# Patient Record
Sex: FEMALE | Race: OTHER | NOT HISPANIC OR LATINO
[De-identification: names, ages, dates, MRNs, and addresses within clinical notes are randomized per-mention and may not be internally consistent; named-entity substitution may affect disease eponyms.]

---

## 2017-04-21 ENCOUNTER — APPOINTMENT (OUTPATIENT)
Dept: SPEECH THERAPY | Facility: CLINIC | Age: 57
End: 2017-04-21

## 2017-04-21 ENCOUNTER — OUTPATIENT (OUTPATIENT)
Dept: OUTPATIENT SERVICES | Facility: HOSPITAL | Age: 57
LOS: 1 days | Discharge: HOME | End: 2017-04-21

## 2017-04-25 DIAGNOSIS — H90.42 SENSORINEURAL HEARING LOSS, UNILATERAL, LEFT EAR, WITH UNRESTRICTED HEARING ON THE CONTRALATERAL SIDE: ICD-10-CM

## 2017-07-11 ENCOUNTER — APPOINTMENT (OUTPATIENT)
Dept: OTOLARYNGOLOGY | Facility: CLINIC | Age: 57
End: 2017-07-11

## 2021-05-31 ENCOUNTER — EMERGENCY (EMERGENCY)
Facility: HOSPITAL | Age: 61
LOS: 1 days | Discharge: HOME | End: 2021-05-31
Payer: COMMERCIAL

## 2021-05-31 VITALS
SYSTOLIC BLOOD PRESSURE: 143 MMHG | HEIGHT: 66 IN | HEART RATE: 76 BPM | OXYGEN SATURATION: 100 % | DIASTOLIC BLOOD PRESSURE: 82 MMHG | WEIGHT: 134.04 LBS | RESPIRATION RATE: 18 BRPM | TEMPERATURE: 98 F

## 2021-05-31 VITALS
RESPIRATION RATE: 20 BRPM | DIASTOLIC BLOOD PRESSURE: 80 MMHG | OXYGEN SATURATION: 98 % | TEMPERATURE: 98 F | HEART RATE: 69 BPM | SYSTOLIC BLOOD PRESSURE: 118 MMHG

## 2021-05-31 DIAGNOSIS — D21.9 BENIGN NEOPLASM OF CONNECTIVE AND OTHER SOFT TISSUE, UNSPECIFIED: Chronic | ICD-10-CM

## 2021-05-31 LAB
ALBUMIN SERPL ELPH-MCNC: 4.2 G/DL — SIGNIFICANT CHANGE UP (ref 3.3–5)
ALP SERPL-CCNC: 75 U/L — SIGNIFICANT CHANGE UP (ref 40–120)
ALT FLD-CCNC: 43 U/L — SIGNIFICANT CHANGE UP (ref 10–45)
ANION GAP SERPL CALC-SCNC: 12 MMOL/L — SIGNIFICANT CHANGE UP (ref 5–17)
APPEARANCE UR: CLEAR — SIGNIFICANT CHANGE UP
AST SERPL-CCNC: 32 U/L — SIGNIFICANT CHANGE UP (ref 10–40)
BACTERIA # UR AUTO: NEGATIVE — SIGNIFICANT CHANGE UP
BASOPHILS # BLD AUTO: 0.06 K/UL — SIGNIFICANT CHANGE UP (ref 0–0.2)
BASOPHILS NFR BLD AUTO: 1 % — SIGNIFICANT CHANGE UP (ref 0–2)
BILIRUB SERPL-MCNC: 0.4 MG/DL — SIGNIFICANT CHANGE UP (ref 0.2–1.2)
BILIRUB UR-MCNC: NEGATIVE — SIGNIFICANT CHANGE UP
BUN SERPL-MCNC: 10 MG/DL — SIGNIFICANT CHANGE UP (ref 7–23)
CALCIUM SERPL-MCNC: 9.2 MG/DL — SIGNIFICANT CHANGE UP (ref 8.4–10.5)
CHLORIDE SERPL-SCNC: 106 MMOL/L — SIGNIFICANT CHANGE UP (ref 96–108)
CO2 SERPL-SCNC: 23 MMOL/L — SIGNIFICANT CHANGE UP (ref 22–31)
COLOR SPEC: SIGNIFICANT CHANGE UP
CREAT SERPL-MCNC: 0.54 MG/DL — SIGNIFICANT CHANGE UP (ref 0.5–1.3)
DIFF PNL FLD: NEGATIVE — SIGNIFICANT CHANGE UP
EOSINOPHIL # BLD AUTO: 0.18 K/UL — SIGNIFICANT CHANGE UP (ref 0–0.5)
EOSINOPHIL NFR BLD AUTO: 3 % — SIGNIFICANT CHANGE UP (ref 0–6)
EPI CELLS # UR: 0 /HPF — SIGNIFICANT CHANGE UP
GLUCOSE SERPL-MCNC: 113 MG/DL — HIGH (ref 70–99)
GLUCOSE UR QL: NEGATIVE — SIGNIFICANT CHANGE UP
HCT VFR BLD CALC: 41.9 % — SIGNIFICANT CHANGE UP (ref 34.5–45)
HGB BLD-MCNC: 13.8 G/DL — SIGNIFICANT CHANGE UP (ref 11.5–15.5)
HYALINE CASTS # UR AUTO: 0 /LPF — SIGNIFICANT CHANGE UP (ref 0–2)
IMM GRANULOCYTES NFR BLD AUTO: 0.5 % — SIGNIFICANT CHANGE UP (ref 0–1.5)
KETONES UR-MCNC: NEGATIVE — SIGNIFICANT CHANGE UP
LEUKOCYTE ESTERASE UR-ACNC: NEGATIVE — SIGNIFICANT CHANGE UP
LYMPHOCYTES # BLD AUTO: 1.69 K/UL — SIGNIFICANT CHANGE UP (ref 1–3.3)
LYMPHOCYTES # BLD AUTO: 28.3 % — SIGNIFICANT CHANGE UP (ref 13–44)
MCHC RBC-ENTMCNC: 31.5 PG — SIGNIFICANT CHANGE UP (ref 27–34)
MCHC RBC-ENTMCNC: 32.9 GM/DL — SIGNIFICANT CHANGE UP (ref 32–36)
MCV RBC AUTO: 95.7 FL — SIGNIFICANT CHANGE UP (ref 80–100)
MONOCYTES # BLD AUTO: 0.49 K/UL — SIGNIFICANT CHANGE UP (ref 0–0.9)
MONOCYTES NFR BLD AUTO: 8.2 % — SIGNIFICANT CHANGE UP (ref 2–14)
NEUTROPHILS # BLD AUTO: 3.52 K/UL — SIGNIFICANT CHANGE UP (ref 1.8–7.4)
NEUTROPHILS NFR BLD AUTO: 59 % — SIGNIFICANT CHANGE UP (ref 43–77)
NITRITE UR-MCNC: NEGATIVE — SIGNIFICANT CHANGE UP
NRBC # BLD: 0 /100 WBCS — SIGNIFICANT CHANGE UP (ref 0–0)
PH UR: 8 — SIGNIFICANT CHANGE UP (ref 5–8)
PLATELET # BLD AUTO: 175 K/UL — SIGNIFICANT CHANGE UP (ref 150–400)
POTASSIUM SERPL-MCNC: 4.2 MMOL/L — SIGNIFICANT CHANGE UP (ref 3.5–5.3)
POTASSIUM SERPL-SCNC: 4.2 MMOL/L — SIGNIFICANT CHANGE UP (ref 3.5–5.3)
PROT SERPL-MCNC: 7.5 G/DL — SIGNIFICANT CHANGE UP (ref 6–8.3)
PROT UR-MCNC: NEGATIVE — SIGNIFICANT CHANGE UP
RBC # BLD: 4.38 M/UL — SIGNIFICANT CHANGE UP (ref 3.8–5.2)
RBC # FLD: 12.6 % — SIGNIFICANT CHANGE UP (ref 10.3–14.5)
RBC CASTS # UR COMP ASSIST: 3 /HPF — SIGNIFICANT CHANGE UP (ref 0–4)
SODIUM SERPL-SCNC: 141 MMOL/L — SIGNIFICANT CHANGE UP (ref 135–145)
SP GR SPEC: 1.01 — SIGNIFICANT CHANGE UP (ref 1.01–1.02)
UROBILINOGEN FLD QL: NEGATIVE — SIGNIFICANT CHANGE UP
WBC # BLD: 5.97 K/UL — SIGNIFICANT CHANGE UP (ref 3.8–10.5)
WBC # FLD AUTO: 5.97 K/UL — SIGNIFICANT CHANGE UP (ref 3.8–10.5)
WBC UR QL: 1 /HPF — SIGNIFICANT CHANGE UP (ref 0–5)

## 2021-05-31 PROCEDURE — 93010 ELECTROCARDIOGRAM REPORT: CPT

## 2021-05-31 PROCEDURE — 96374 THER/PROPH/DIAG INJ IV PUSH: CPT

## 2021-05-31 PROCEDURE — 85025 COMPLETE CBC W/AUTO DIFF WBC: CPT

## 2021-05-31 PROCEDURE — 70450 CT HEAD/BRAIN W/O DYE: CPT | Mod: 26,MA

## 2021-05-31 PROCEDURE — 99285 EMERGENCY DEPT VISIT HI MDM: CPT

## 2021-05-31 PROCEDURE — 82962 GLUCOSE BLOOD TEST: CPT

## 2021-05-31 PROCEDURE — 80053 COMPREHEN METABOLIC PANEL: CPT

## 2021-05-31 PROCEDURE — 81001 URINALYSIS AUTO W/SCOPE: CPT

## 2021-05-31 PROCEDURE — 93005 ELECTROCARDIOGRAM TRACING: CPT

## 2021-05-31 PROCEDURE — 96375 TX/PRO/DX INJ NEW DRUG ADDON: CPT

## 2021-05-31 PROCEDURE — 87086 URINE CULTURE/COLONY COUNT: CPT

## 2021-05-31 PROCEDURE — 70450 CT HEAD/BRAIN W/O DYE: CPT

## 2021-05-31 PROCEDURE — 99284 EMERGENCY DEPT VISIT MOD MDM: CPT | Mod: 25

## 2021-05-31 RX ORDER — MECLIZINE HCL 12.5 MG
1 TABLET ORAL
Qty: 15 | Refills: 0
Start: 2021-05-31 | End: 2021-08-06

## 2021-05-31 RX ORDER — ONDANSETRON 8 MG/1
1 TABLET, FILM COATED ORAL
Qty: 16 | Refills: 0
Start: 2021-05-31 | End: 2021-06-03

## 2021-05-31 RX ORDER — MECLIZINE HCL 12.5 MG
25 TABLET ORAL ONCE
Refills: 0 | Status: COMPLETED | OUTPATIENT
Start: 2021-05-31 | End: 2021-05-31

## 2021-05-31 RX ORDER — ONDANSETRON 8 MG/1
1 TABLET, FILM COATED ORAL
Qty: 16 | Refills: 0
Start: 2021-05-31 | End: 2021-08-05

## 2021-05-31 RX ORDER — MECLIZINE HCL 12.5 MG
1 TABLET ORAL
Qty: 15 | Refills: 0
Start: 2021-05-31 | End: 2021-06-04

## 2021-05-31 RX ORDER — ONDANSETRON 8 MG/1
4 TABLET, FILM COATED ORAL ONCE
Refills: 0 | Status: COMPLETED | OUTPATIENT
Start: 2021-05-31 | End: 2021-05-31

## 2021-05-31 RX ORDER — SODIUM CHLORIDE 9 MG/ML
1000 INJECTION, SOLUTION INTRAVENOUS ONCE
Refills: 0 | Status: COMPLETED | OUTPATIENT
Start: 2021-05-31 | End: 2021-05-31

## 2021-05-31 RX ADMIN — SODIUM CHLORIDE 2000 MILLILITER(S): 9 INJECTION, SOLUTION INTRAVENOUS at 12:35

## 2021-05-31 RX ADMIN — ONDANSETRON 4 MILLIGRAM(S): 8 TABLET, FILM COATED ORAL at 12:04

## 2021-05-31 RX ADMIN — Medication 25 MILLIGRAM(S): at 12:04

## 2021-05-31 NOTE — ED PROVIDER NOTE - PATIENT PORTAL LINK FT
You can access the FollowMyHealth Patient Portal offered by [facility] by registering at the following website: [email or web address]. By joining [employer]’s FollowMyHealth portal, you will also be able to view your health information using other applications (apps) compatible with our system.

## 2021-05-31 NOTE — ED PROVIDER NOTE - OBJECTIVE STATEMENT
60F hx of hypothyroidism presents to the ED for episodic dizziness ongoing x5 days, worse with movement, better with lying still, that patient felt was similar to prior episodes of vertigo. Tried meclizine and tylenol with some improvement. Today at 5am, patient woke up, felt dizzy (room spinning), went back to sleep. Then woke up again, tried to walk to the bathroom, felt dizziness, and subsequently had a syncopal event x1 (witnessed by , fell to floor onto right knee) woke up within seconds. Since arrival to the ED, patient felt much improved and is ambulatory. Denies ringing in ear, fever, chills, chest pain, shortness of breath, vomiting, facial droop, weakness, bleeding anywhere. Follows with an ENT outpatient.

## 2021-05-31 NOTE — ED PROVIDER NOTE - PHYSICAL EXAMINATION
GENERAL: middle aged female, lying in bed, NAD. Vital signs are within normal limits  EYES: Conjunctiva noninjected or pale, sclera anicteric  HENT: NC/AT, moist mucous membranes, tmi b/l  NECK: Supple, trachea midline, no carotid bruits  LUNG: Nonlabored respirations, no wheezes, rales  CV: RRR, no murmurs, Pulses- Radial: 2+ b/l  ABDOMEN: Nondistended, nontender  MSK: No visible deformities, nontender extremities, no LE edema  SKIN: No rashes, bruises  NEURO: AAOx4 (to person, place, time, event), no tremor, steady gait  -Cranial Nerves:  --CN II: PERRL  --CN III, IV, VI: EOMI bilateral, no nystagmus  --CN V1-3: Facial sensation intact to touch  --CN VII: No facial asymmetry or droop  --CN VIII: Hearing intact to rubbing fingers b/l  --CN IX, X: Palate elevates symmetrically. Normal phonation  --CN XI: Heading turning and shoulder shrug intact b/l  --CN XII: Tongue midline with normal movements   PSYCH: Normal mood and affect GENERAL: middle aged female, lying in bed, NAD. Vital signs are within normal limits  EYES: Conjunctiva noninjected or pale, sclera anicteric  HENT: NC/AT, moist mucous membranes, tmi b/l  NECK: Supple, trachea midline, no carotid bruits  LUNG: Nonlabored respirations, no wheezes, rales  CV: RRR, no murmurs, Pulses- Radial: 2+ b/l  ABDOMEN: Nondistended, nontender  MSK: No visible deformities, nontender extremities, no LE edema  SKIN: No rashes, bruises  NEURO: AAOx4 (to person, place, time, event), no tremor, steady gait but appears stiff when walking  -Cranial Nerves:  --CN II: PERRL  --CN III, IV, VI: EOMI bilateral, no nystagmus  --CN V1-3: Facial sensation intact to touch  --CN VII: No facial asymmetry or droop  --CN VIII: Hearing intact to rubbing fingers b/l  --CN IX, X: Palate elevates symmetrically. Normal phonation  --CN XI: Heading turning and shoulder shrug intact b/l  --CN XII: Tongue midline with normal movements   PSYCH: Normal mood and affect

## 2021-05-31 NOTE — ED ADULT TRIAGE NOTE - CHIEF COMPLAINT QUOTE
Pt has for 4 to 5 days felt dizzy pt feels like the room is spinning pt states she lost conscientious 5 tis morning and went back to bed and took a nap

## 2021-05-31 NOTE — ED PROVIDER NOTE - NSFOLLOWUPINSTRUCTIONS_ED_ALL_ED_FT
YOU WERE SEEN IN THE ED FOR: dizziness    YOU WERE PRESCRIBED: zofran and meclizine  FOLLOW THE INSTRUCTIONS ON THE LABEL/CONTAINER    STAY HYDRATED,    PLEASE FOLLOW UP WITH YOUR PRIVATE PHYSICIAN WITHIN THE NEXT 48 HOURS. BRING COPIES OF YOUR RESULTS.    RETURN TO THE EMERGENCY DEPARTMENT IF YOU EXPERIENCE ANY NEW/CONCERNING/WORSENING SYMPTOMS.

## 2021-05-31 NOTE — ED ADULT NURSE NOTE - OBJECTIVE STATEMENT
60 year old female with Hx of vertigo presents to the ED complaining of 5 days of dizziness and one episode of syncope this AM. Pt states this morning she syncopized around 0500 and then ent back to bed and took a nap. Pt is A&O x 4, VSS, afebrile, ambulating independently. Pt denies fever, chills, VD, SOB, or chest pain. IV placed, labs drawn, bed in low position, safety measures in place. Pt placed on CM showing NS, BG WNL, EKG completed in triage and given to attending. call bell within reach. 60 year old female with Hx of vertigo presents to the ED complaining of 5 days of dizziness and one episode of syncope this AM. Pt states this morning she syncopized around 0500 and then ent back to bed and took a nap. Pt is A&O x 4, VSS, afebrile, ambulating independently. Pt denies fever, chills, VD, SOB, or chest pain. IV placed, labs drawn, bed in low position, safety measures in place. Pt placed on CM showing NS, BG WNL, EKG completed in triage and given to attending. call bell within reach. on neurological assessment no neural or focal deficits noted.

## 2021-05-31 NOTE — ED PROVIDER NOTE - NS ED ROS FT
CONSTITUTIONAL: DIZZINESS. No fevers, chills, fatigue, weakness, unexpected weight change  EYES: No double vision, blurry vision  ENT: No ear pain, nasal congestion, runny nose, sore throat, tooth pain  CV: No chest pain, palpitations  PULM: No cough, shortness of breath  GI: + nausea. No abdominal pain, vomiting, diarrhea  : No dysuria, polyuria, hematuria  SKIN: No rashes, facial swelling  MSK: No neck pain  NEURO: + syncope. No headache, numbness, tingling, seizures  PSYCHIATRIC: No auditory, visual, tactile hallucinations CONSTITUTIONAL: DIZZINESS. No fevers, chills, fatigue, weakness, unexpected weight change  EYES: No double vision, blurry vision  ENT: No ear pain, nasal congestion, runny nose, sore throat, tooth pain  CV: No chest pain, palpitations  PULM: No cough, shortness of breath  GI: + nausea. No abdominal pain, vomiting, diarrhea  : No dysuria, polyuria, hematuria  SKIN: No rashes, facial swelling  MSK: No neck pain  NEURO: + syncope, mild headache. No numbness, tingling, seizures  PSYCHIATRIC: No auditory, visual, tactile hallucinations

## 2021-05-31 NOTE — ED PROVIDER NOTE - PROGRESS NOTE DETAILS
Ramo Patel D.O., PGY2 (Resident)  CTH with what appears to look like right interparenchymal hemorrhage. Patient with slight headache. No focal neuro deficits. Pending official rad read. Ramo Patel D.O., PGY2 (Resident)  Formerly [facility], [facility]. Patient significantly improved. Ambulatory. No focal neuro deficits.  Results endorsed. Return precautions given. Patient expressed verbal understanding.  Will DC with outpatient follow-up.

## 2021-05-31 NOTE — ED ADULT NURSE NOTE - NSIMPLEMENTINTERV_GEN_ALL_ED
Implemented All Fall Risk Interventions:  [place] to call system. Call bell, personal items and telephone within reach. Instruct patient to call for assistance. Room bathroom lighting operational. Non-slip footwear when patient is off stretcher. Physically safe environment: no spills, clutter or unnecessary equipment. Stretcher in lowest position, wheels locked, appropriate side rails in place. Provide visual cue, wrist band, yellow gown, etc. Monitor gait and stability. Monitor for mental status changes and reorient to person, place, and time. Review medications for side effects contributing to fall risk. Reinforce activity limits and safety measures with patient and family.

## 2021-05-31 NOTE — ED PROVIDER NOTE - CLINICAL SUMMARY MEDICAL DECISION MAKING FREE TEXT BOX
60F hx of hypothyroidism here for 5 days of dizziness, worse today with witnessed syncopal episode. Ambulatory w/o focal neuro deficits. Vitals wnl. Suspect more peripheral neuro etiology. Lower suspicion for primary neurovasc. Possible intracranial mass. Unlikely cardiac etiology. check labs, cardiac monitor, ekg, FS (for hypoglcyemia).

## 2021-06-01 LAB
CULTURE RESULTS: SIGNIFICANT CHANGE UP
SPECIMEN SOURCE: SIGNIFICANT CHANGE UP

## 2021-08-02 ENCOUNTER — EMERGENCY (EMERGENCY)
Facility: HOSPITAL | Age: 61
LOS: 1 days | Discharge: HOME | End: 2021-08-02
Attending: EMERGENCY MEDICINE
Payer: COMMERCIAL

## 2021-08-02 VITALS
WEIGHT: 132.94 LBS | OXYGEN SATURATION: 99 % | DIASTOLIC BLOOD PRESSURE: 87 MMHG | HEART RATE: 65 BPM | HEIGHT: 66 IN | RESPIRATION RATE: 15 BRPM | SYSTOLIC BLOOD PRESSURE: 133 MMHG | TEMPERATURE: 98 F

## 2021-08-02 VITALS
TEMPERATURE: 98 F | HEART RATE: 67 BPM | OXYGEN SATURATION: 100 % | RESPIRATION RATE: 16 BRPM | DIASTOLIC BLOOD PRESSURE: 85 MMHG | SYSTOLIC BLOOD PRESSURE: 129 MMHG

## 2021-08-02 DIAGNOSIS — D21.9 BENIGN NEOPLASM OF CONNECTIVE AND OTHER SOFT TISSUE, UNSPECIFIED: Chronic | ICD-10-CM

## 2021-08-02 PROBLEM — E03.9 HYPOTHYROIDISM, UNSPECIFIED: Chronic | Status: ACTIVE | Noted: 2021-05-31

## 2021-08-02 PROCEDURE — 99284 EMERGENCY DEPT VISIT MOD MDM: CPT

## 2021-08-02 PROCEDURE — 99283 EMERGENCY DEPT VISIT LOW MDM: CPT

## 2021-08-02 RX ORDER — CLONAZEPAM 1 MG
0.5 TABLET ORAL ONCE
Refills: 0 | Status: DISCONTINUED | OUTPATIENT
Start: 2021-08-02 | End: 2021-08-02

## 2021-08-02 RX ORDER — MECLIZINE HCL 12.5 MG
50 TABLET ORAL ONCE
Refills: 0 | Status: COMPLETED | OUTPATIENT
Start: 2021-08-02 | End: 2021-08-02

## 2021-08-02 RX ADMIN — Medication 50 MILLIGRAM(S): at 08:39

## 2021-08-02 NOTE — ED ADULT NURSE NOTE - PMH
Adhesive capsulitis of right shoulder    Environmental Allergies    GERD (gastroesophageal reflux disease)    Hypothyroidism    Hypothyroidism    Uterine leiomyoma

## 2021-08-02 NOTE — ED PROVIDER NOTE - PATIENT PORTAL LINK FT
You can access the FollowMyHealth Patient Portal offered by [facility] by registering at the following website: [email or web address]. By joining [employer]’s FollowMyHealth portal, you will also be able to view your health information using other applications (apps) compatible with our system.

## 2021-08-02 NOTE — ED PROVIDER NOTE - CARE PROVIDERS DIRECT ADDRESSES
[email or web address]

## 2021-08-02 NOTE — ED PROVIDER NOTE - CLINICAL SUMMARY MEDICAL DECISION MAKING FREE TEXT BOX
Attending MD Lamas:  61F presenting for evaluation of acute on chronic episodic vertigo. Patient with vertigo upon waking this AM with associated nausea/vomiting, resolving on arrival to ED. Exam without any hard cerebellar signs, maybe a few beats of left beating nystagmus with left end gaze. Associated tinnitus occasionally left ear. Given episodic chronic nature of vertigo and reassuring neuro exam, I do not suspect central pathology, likely peripheral vertigo. Plan for ongoing meclizine, reassess. Patient would benefit from outpatient vestibular rehab, will refer to neuro for such (patient seeing ENT already but was not referred for vestibular rehab).       *The above represents an initial assessment/impression. Please refer to progress notes for potential changes in patient clinical course*

## 2021-08-02 NOTE — ED PROVIDER NOTE - CARE PROVIDER_API CALL
Dick Kirby (DO)  Neurology  [address], [address]  [place] [address]  Phone: [phone]  Fax: [phone]  Follow Up Time:     Castillo Dubose)  Neurology  [address], [address]  [place] [address]  Phone: [phone]  Fax: [phone]  Follow Up Time:

## 2021-08-02 NOTE — ED PROVIDER NOTE - NSFOLLOWUPINSTRUCTIONS_ED_ALL_ED_FT
1. Please stay hydrated and continue all home medications as previously prescribed     2. For persistent Dizziness take Meclizine 1 tab every 8 hours as needed    3. For Nausea you may take Zofran 1 tab as needed every 8 hours as needed     4. Follow up with Neurology this week for further evaluation and management of your dizziness    5. Return to ED for change of symptoms including persistent/worsened dizziness, persistent vomiting, visual changes, headaches, numbness, weakness and any other concerns

## 2021-08-02 NOTE — ED ADULT NURSE NOTE - OBJECTIVE STATEMENT
61y f pt with c/o dizziness and room spinning; pt states has been dx with vertigo in this ed last month; has been taking meclizine; followed up with ent; pt reports has been getting episodes of vertigo every 2-3 days and meclizine has not helped; aox3; no resp distress; + perrl; no vision changes; no chest pain; no abd pain; + n/v; no diarrhea; pt feels not walking normally; pt reports has headache now; no numbness/tingling; no visible neuro deficits; call bell in reach; safety/comfort maintained

## 2021-08-02 NOTE — ED PROVIDER NOTE - OBJECTIVE STATEMENT
61 year old female with pmhx hypothyroidism, vertigo presents to ED c/o worsened room spinning dizziness worsened this am. Pt reports she has had more frequent episodes of dizziness reporting sensation every few days with occasional ringing in her L ear. This am states her dizziness was severe associated with vomiting making it difficult to get up from bed causing her to call EMS. She has been seen by ENT and was prescribed triamterene-hctz but has not had any relief despite taking medications. This morning attempted to take 25mg Meclizine but vomited right after. Has never attempted vestibular rehabilitation. Denies fevers, chills, HA, visual change, numbness, weakness, chest pain, sob, abd pain, diarrhea

## 2021-08-02 NOTE — ED PROVIDER NOTE - ATTENDING CONTRIBUTION TO CARE
Attending MD Lamas:   I personally have seen and examined this patient.  Physician assistant note reviewed and agree on plan of care and except where noted.  See HPI, PE, and MDM for details.

## 2021-08-02 NOTE — ED PROVIDER NOTE - PROGRESS NOTE DETAILS
Pt dizziness improved after medications in ED. Ambulated to bathroom without assistance. Feels comfortable in d/c home. Asked for refill of Meclizine and she is running low. Sent rx to preferred pharmacy. Discussed close f/up with Neuro for further management   Michelle Lu PA-C

## 2021-08-02 NOTE — ED ADULT NURSE NOTE - CHPI ED NUR SYMPTOMS NEG
no blurred vision/no change in level of consciousness/no fever/no loss of consciousness/no numbness/no weakness

## 2021-08-02 NOTE — ED ADULT TRIAGE NOTE - CHIEF COMPLAINT QUOTE
States she has a hx of vertigo it happens every other day, her meclizine is not working - states room is spinning

## 2021-08-02 NOTE — ED PROVIDER NOTE - PHYSICAL EXAMINATION
CONSTITUTIONAL: Patient is awake, alert and oriented x 3. Patient is well appearing and in no acute distress  HEAD: NCAT  EYES: PERRL b/l, EOMI, + horizontal leftward beating nystagmus w/ L gaze   NECK: supple, FROM  LUNGS: CTA B/L  HEART: RRR  ABDOMEN: Soft nd/nttp  EXTREMITY: no edema or calf tenderness b/l, FROM upper and lower ext b/l  SKIN: with no rash or lesions  NEURO: Cn3-12 grossly intact. Strength5/5UE/LE.NmlSensation. Normal finger to nose

## 2022-05-11 NOTE — ED ADULT NURSE NOTE - PLAN OF CARE
What Is The Reason For Today's Visit?: Full Body Skin Examination What Is The Reason For Today's Visit? (Being Monitored For X): the development of new lesions Call bell/Explanation of exam/test/Fall precautions/Position of comfort/Side rails

## 2024-03-25 NOTE — ED PROVIDER NOTE - ATTENDING CONTRIBUTION TO CARE
Problem: Respiratory - Adult  Goal: Able to breathe comfortably  Outcome: Progressing     Problem: Respiratory - Adult  Goal: Patient's breath sounds will be clear and equal  Outcome: Progressing           BRONCHOSPASM/BRONCHOCONSTRICTION    IMPROVE  AERATION/BREATHSOUNDS  ADMINISTER BRONCHODILATOR THERAPY AS APPROPRIATE  ASSESS BREATH SOUNDS  INITIATE AEROSOL PROTOCOL IF ORDERED TO DO SO  PATIENT EDUCATION AS NEEDED   MD Sánchez:  patient seen and evaluated with the resident.  I was present for key portions of the History & Physical, and I agree with the Impression & Plan.  61 yo F, presents to the ED after possible syncopal episode in the context of 5d dizziness.   +MHx of vertigo (has undergone outpatient MRI brain/neck) and is being followed as an outpatient by ENT.  Woke up this morning with intense room-spinning sensation - so much so that she fell over, hitting her head, and losing consciousness, for what she describes as "seconds."  Patient has no MHx of DM, HTN, HLD, CVA, or CAD.    Quality:  daily room-spinning sensation; worse 1st thing in the AM  Duration: 5 days  Associated Sx:  Patient has no focal weakness/numbness, no slurred speech, no F/C, no neck pain, no CP/SOB, no blurry vision/double vision, no ataxia.   Patient has mild HA, but she attributes this to head injury sustained in the fall this AM.    VS: wnl.     Physical Exam: adult F, mild distress, NCAT, PERRL, EOMI, Neck - supple, CTA B, RRR, Abd: s/nd/nt, Ext: no edema.    Neuro:  AAOx3, ambulates w/o diff, CN2-12 intact, Gait - normal, normal finger-to-nose, normal heel-to-toe.  Strength 5/5 bilaterally throughout, no facial droop.    Impression:   H&P most c/w BPPV and associated fall because of it.  I suspect the LOC was more likely from head injury than true syncope.  ECG is unremarkable and she has no cardiac/CVA RFs beyond her age.   Patient has no focal weakness/numbness, no slurred speech, no F/C, no neck pain, no CP/SOB, no blurry vision/double vision, no ataxia.   We do not suspect CVA, given that the patient has no risk factors, is a nonsmoker, has no family Hx of CVA, has normal VS, and an unremarkable neurologic exam.    Plan:  IVF, labs, ECG, UA,  meds, reassess.  CT head for evaluation of head injury ([facility] fall with head injury in context of vertigo)

## 2025-04-18 ENCOUNTER — EMERGENCY (EMERGENCY)
Facility: HOSPITAL | Age: 65
LOS: 1 days | End: 2025-04-18
Attending: EMERGENCY MEDICINE
Payer: COMMERCIAL

## 2025-04-18 VITALS
TEMPERATURE: 98 F | HEART RATE: 80 BPM | RESPIRATION RATE: 20 BRPM | WEIGHT: 134.04 LBS | DIASTOLIC BLOOD PRESSURE: 124 MMHG | OXYGEN SATURATION: 100 % | SYSTOLIC BLOOD PRESSURE: 162 MMHG | HEIGHT: 66 IN

## 2025-04-18 VITALS
SYSTOLIC BLOOD PRESSURE: 118 MMHG | DIASTOLIC BLOOD PRESSURE: 84 MMHG | OXYGEN SATURATION: 98 % | HEART RATE: 71 BPM | RESPIRATION RATE: 16 BRPM | TEMPERATURE: 98 F

## 2025-04-18 DIAGNOSIS — D21.9 BENIGN NEOPLASM OF CONNECTIVE AND OTHER SOFT TISSUE, UNSPECIFIED: Chronic | ICD-10-CM

## 2025-04-18 LAB
ALBUMIN SERPL ELPH-MCNC: 4.4 G/DL — SIGNIFICANT CHANGE UP (ref 3.3–5)
ALP SERPL-CCNC: 89 U/L — SIGNIFICANT CHANGE UP (ref 40–120)
ALT FLD-CCNC: 94 U/L — HIGH (ref 10–45)
ANION GAP SERPL CALC-SCNC: 11 MMOL/L — SIGNIFICANT CHANGE UP (ref 5–17)
APTT BLD: 29.3 SEC — SIGNIFICANT CHANGE UP (ref 24.5–35.6)
AST SERPL-CCNC: 52 U/L — HIGH (ref 10–40)
BASOPHILS # BLD AUTO: 0.04 K/UL — SIGNIFICANT CHANGE UP (ref 0–0.2)
BASOPHILS NFR BLD AUTO: 0.5 % — SIGNIFICANT CHANGE UP (ref 0–2)
BILIRUB SERPL-MCNC: 0.3 MG/DL — SIGNIFICANT CHANGE UP (ref 0.2–1.2)
BUN SERPL-MCNC: 10 MG/DL — SIGNIFICANT CHANGE UP (ref 7–23)
CALCIUM SERPL-MCNC: 9.2 MG/DL — SIGNIFICANT CHANGE UP (ref 8.4–10.5)
CHLORIDE SERPL-SCNC: 107 MMOL/L — SIGNIFICANT CHANGE UP (ref 96–108)
CO2 SERPL-SCNC: 23 MMOL/L — SIGNIFICANT CHANGE UP (ref 22–31)
CREAT SERPL-MCNC: 0.58 MG/DL — SIGNIFICANT CHANGE UP (ref 0.5–1.3)
EGFR: 101 ML/MIN/1.73M2 — SIGNIFICANT CHANGE UP
EGFR: 101 ML/MIN/1.73M2 — SIGNIFICANT CHANGE UP
EOSINOPHIL # BLD AUTO: 0.04 K/UL — SIGNIFICANT CHANGE UP (ref 0–0.5)
EOSINOPHIL NFR BLD AUTO: 0.5 % — SIGNIFICANT CHANGE UP (ref 0–6)
FLUAV AG NPH QL: SIGNIFICANT CHANGE UP
FLUBV AG NPH QL: SIGNIFICANT CHANGE UP
GLUCOSE SERPL-MCNC: 110 MG/DL — HIGH (ref 70–99)
HCT VFR BLD CALC: 40.2 % — SIGNIFICANT CHANGE UP (ref 34.5–45)
HGB BLD-MCNC: 13.6 G/DL — SIGNIFICANT CHANGE UP (ref 11.5–15.5)
IMM GRANULOCYTES NFR BLD AUTO: 0.4 % — SIGNIFICANT CHANGE UP (ref 0–0.9)
INR BLD: 0.98 RATIO — SIGNIFICANT CHANGE UP (ref 0.85–1.16)
LYMPHOCYTES # BLD AUTO: 1.9 K/UL — SIGNIFICANT CHANGE UP (ref 1–3.3)
LYMPHOCYTES # BLD AUTO: 25.5 % — SIGNIFICANT CHANGE UP (ref 13–44)
MCHC RBC-ENTMCNC: 32.4 PG — SIGNIFICANT CHANGE UP (ref 27–34)
MCHC RBC-ENTMCNC: 33.8 G/DL — SIGNIFICANT CHANGE UP (ref 32–36)
MCV RBC AUTO: 95.7 FL — SIGNIFICANT CHANGE UP (ref 80–100)
MONOCYTES # BLD AUTO: 0.46 K/UL — SIGNIFICANT CHANGE UP (ref 0–0.9)
MONOCYTES NFR BLD AUTO: 6.2 % — SIGNIFICANT CHANGE UP (ref 2–14)
NEUTROPHILS # BLD AUTO: 4.98 K/UL — SIGNIFICANT CHANGE UP (ref 1.8–7.4)
NEUTROPHILS NFR BLD AUTO: 66.9 % — SIGNIFICANT CHANGE UP (ref 43–77)
NRBC BLD AUTO-RTO: 0 /100 WBCS — SIGNIFICANT CHANGE UP (ref 0–0)
PLATELET # BLD AUTO: 179 K/UL — SIGNIFICANT CHANGE UP (ref 150–400)
POTASSIUM SERPL-MCNC: 4.7 MMOL/L — SIGNIFICANT CHANGE UP (ref 3.5–5.3)
POTASSIUM SERPL-SCNC: 4.7 MMOL/L — SIGNIFICANT CHANGE UP (ref 3.5–5.3)
PROT SERPL-MCNC: 7.5 G/DL — SIGNIFICANT CHANGE UP (ref 6–8.3)
PROTHROM AB SERPL-ACNC: 11.2 SEC — SIGNIFICANT CHANGE UP (ref 9.9–13.4)
RBC # BLD: 4.2 M/UL — SIGNIFICANT CHANGE UP (ref 3.8–5.2)
RBC # FLD: 12.1 % — SIGNIFICANT CHANGE UP (ref 10.3–14.5)
RSV RNA NPH QL NAA+NON-PROBE: SIGNIFICANT CHANGE UP
SARS-COV-2 RNA SPEC QL NAA+PROBE: SIGNIFICANT CHANGE UP
SODIUM SERPL-SCNC: 141 MMOL/L — SIGNIFICANT CHANGE UP (ref 135–145)
SOURCE RESPIRATORY: SIGNIFICANT CHANGE UP
WBC # BLD: 7.45 K/UL — SIGNIFICANT CHANGE UP (ref 3.8–10.5)
WBC # FLD AUTO: 7.45 K/UL — SIGNIFICANT CHANGE UP (ref 3.8–10.5)

## 2025-04-18 PROCEDURE — 70498 CT ANGIOGRAPHY NECK: CPT | Mod: MC

## 2025-04-18 PROCEDURE — 93010 ELECTROCARDIOGRAM REPORT: CPT

## 2025-04-18 PROCEDURE — 70496 CT ANGIOGRAPHY HEAD: CPT | Mod: 26

## 2025-04-18 PROCEDURE — 70450 CT HEAD/BRAIN W/O DYE: CPT | Mod: 26,59

## 2025-04-18 PROCEDURE — 87637 SARSCOV2&INF A&B&RSV AMP PRB: CPT

## 2025-04-18 PROCEDURE — 70496 CT ANGIOGRAPHY HEAD: CPT | Mod: MC

## 2025-04-18 PROCEDURE — 70450 CT HEAD/BRAIN W/O DYE: CPT | Mod: MC

## 2025-04-18 PROCEDURE — 70481 CT ORBIT/EAR/FOSSA W/DYE: CPT | Mod: MC

## 2025-04-18 PROCEDURE — 99285 EMERGENCY DEPT VISIT HI MDM: CPT | Mod: 25

## 2025-04-18 PROCEDURE — 80053 COMPREHEN METABOLIC PANEL: CPT

## 2025-04-18 PROCEDURE — 85730 THROMBOPLASTIN TIME PARTIAL: CPT

## 2025-04-18 PROCEDURE — 82962 GLUCOSE BLOOD TEST: CPT

## 2025-04-18 PROCEDURE — 85025 COMPLETE CBC W/AUTO DIFF WBC: CPT

## 2025-04-18 PROCEDURE — 93005 ELECTROCARDIOGRAM TRACING: CPT

## 2025-04-18 PROCEDURE — 99285 EMERGENCY DEPT VISIT HI MDM: CPT

## 2025-04-18 PROCEDURE — 70498 CT ANGIOGRAPHY NECK: CPT | Mod: 26

## 2025-04-18 PROCEDURE — 85610 PROTHROMBIN TIME: CPT

## 2025-04-18 PROCEDURE — 70481 CT ORBIT/EAR/FOSSA W/DYE: CPT | Mod: 26

## 2025-04-18 RX ORDER — MECLIZINE HCL 12.5 MG
25 TABLET ORAL ONCE
Refills: 0 | Status: COMPLETED | OUTPATIENT
Start: 2025-04-18 | End: 2025-04-18

## 2025-04-18 RX ADMIN — Medication 25 MILLIGRAM(S): at 17:21

## 2025-04-18 NOTE — ED PROVIDER NOTE - NSFOLLOWUPINSTRUCTIONS_ED_ALL_ED_FT
Dizziness can manifest as a feeling of unsteadiness or light-headedness. You may feel like you are about to faint. This condition can be caused by a number of things, including medicines, dehydration, or illness. Drink enough fluid to keep your urine clear or pale yellow. Do not drink alcohol and limit your caffeine intake. Avoid quick or sudden movements.  Rise slowly from chairs and steady yourself until you feel okay. In the morning, first sit up on the side of the bed.    Please continue to take meclizine as prescribed.      As discussed, please follow-up with your ENT doctor and neurologist outpatient.    We also recommend that you follow-up with the vestibular clinic regarding today's visit.  You will receive a phone call to schedule an event.  In case you do not, please call [phone] for an appointment.     SEEK IMMEDIATE MEDICAL CARE IF YOU HAVE ANY OF THE FOLLOWING SYMPTOMS: vomiting, changes in your vision or speech, weakness in your arms or legs, trouble speaking or swallowing, chest pain, abdominal pain, shortness of breath, sweating, bleeding, headache, neck pain, or fever.

## 2025-04-18 NOTE — ED PROVIDER NOTE - ATTENDING CONTRIBUTION TO CARE
Attending MD Evelyn Haji:  I personally have seen and examined this patient.  Resident note reviewed and agree on plan of care and except where noted.  See HPI, PE, and MDM for details.

## 2025-04-18 NOTE — ED ADULT NURSE NOTE - OBJECTIVE STATEMENT
64 year old female presents to ED c/o dizziness that started 11am today along with R neck pain.  Patient states she took meclizine and felt better.  PERRL. Patient denies chest pain, fever or nausea.   at bedside.  In no acute distress.

## 2025-04-18 NOTE — ED PROVIDER NOTE - PATIENT PORTAL LINK FT
You can access the FollowMyHealth Patient Portal offered by [facility] by registering at the following website: [email or web address]. By joining [employer]’s FollowMyHealth portal, you will also be able to view your health information using other applications (apps) compatible with our system.

## 2025-04-18 NOTE — ED PROVIDER NOTE - OBJECTIVE STATEMENT
left ear ringing and dizziness started at 11am today, took Meclizine and felt better, a friend drove her back home and  brought her here to get evaluated. Was seen by ENT yesterday for her left ear ringing. Also c/o rt shoulder and neck pain, atraumatic.  dizziness, dizziness and ear ringing 65 y/o hx vertigo presents with tinnitus, vertiginous symptoms. She states she has been dealing with these symptoms intermittently (once every couple of months) for the last few years, but over the last month it has begun happening daily. She was seen by ENT yesterday who recommended she get cat scans to evaluate her head/ear. Symptoms today improved slightly with meclizine but are still present. They are worsened by any head movement. She also has pain in her right trapezius area that is worsened by neck movement. No vomiting, no ear pain, no chest pain, no SOB, no abdominal pain, no fever/chills.

## 2025-04-18 NOTE — ED PROVIDER NOTE - CLINICAL SUMMARY MEDICAL DECISION MAKING FREE TEXT BOX
65 y/o hx vertigo presents with tinnitus, vertiginous symptoms. She is hemodynamically stable, afebrile, on exam she is uncomfortable appearing with even, unlabored respirations. EOMI, PERRL, no facial asymmetry, FTN intact, no pronator drift, no motor/sensory deficits in extremities. No rash/vesicles in either ear canal, no TM effusion/erythema in either ear. No midline neck pain, slight tenderness in right trapezius. Symptoms likely related to known BPPV, but given increasing frequency, tinnitus, neck pain, will get imaging, labs, sx control, reassess. 63 y/o hx vertigo presents with tinnitus, vertiginous symptoms. She is hemodynamically stable, afebrile, on exam she is uncomfortable appearing with even, unlabored respirations. EOMI, PERRL, no facial asymmetry, FTN intact, no pronator drift, no motor/sensory deficits in extremities. No rash/vesicles in either ear canal, no TM effusion/erythema in either ear. No midline neck pain, slight tenderness in right trapezius. Symptoms likely related to known BPPV, but given increasing frequency, tinnitus, neck pain, will get imaging, labs, sx control, reassess.  Attending Evelyn Haji: 64-year-old female presenting with headache and dizziness as well as tinnitus.  Patient has been evaluated by neurology as well as ENT.  Neurology is recommending a CT of the neck.  Patient with symptoms have been progressive.  Upon arrival patient is awake alert following commands.  No recent falls or trauma.  No recent head family relations.  No temporal tenderness palpation making temporal arteritis less likely.  Patient is out of the window for any kind of stroke.  Symptoms started more than 24 hours ago.  On exam patient is awake alert following commands.  Nonfocal neurologic exam.  No auricular or mastoid tenderness palpation.  Consider possible posterior cause of patient's dizziness.  No jaw tenderness to palpation and patient is in claudication.  With history of tinnitus consider Ménière's disease.  Will obtain labs, CT scan of the head and neck as well as CT scan of the auditory canal to evaluate for any colitis normally versus neuroma.  Will provide symptomatic treatment and reeval pending imaging.  Patient with appointment with neurology for this week.

## 2025-04-18 NOTE — ED ADULT TRIAGE NOTE - CHIEF COMPLAINT QUOTE
left ear ringing and dizziness started at 11am today, took Meclizine and felt better, a friend drove her back home and  brought her here to get evaluated. Was seen by ENT yesterday for her left ear ringing. Also c/o rt shoulder and neck pain, atraumatic.

## 2025-04-18 NOTE — ED ADULT NURSE NOTE - NSFALLRISKINTERV_ED_ALL_ED

## 2025-04-18 NOTE — ED PROVIDER NOTE - PROGRESS NOTE DETAILS
Attending Page: I received sign out on this patient. I am aware of the previously determined ongoing plan of care and what, if any, tests/consults are pending from the previous provider. I am available for supervision of the ongoing plan of care for the Resident/SERENA/Fellow/Student. [place] Carlos, DO (PGY2):  Patient seen at shift change.  Briefly, she is a 64-year-old female with a history of vertigo, being followed by ENT outpatient, who presented for tinnitus and vertiginous symptoms. Labs work largely nonactionable. CTs with no acute abnormalities. Patient reassessed, asymptomatic at this time. She's ambulating with no issues. Will discharge with ENT follow up. Will also give referral to vestibular clinic. Patient feels comfortable going home. Return precautions and follow up instructions communicated. All questions answered.